# Patient Record
Sex: FEMALE | Race: WHITE | NOT HISPANIC OR LATINO | URBAN - METROPOLITAN AREA
[De-identification: names, ages, dates, MRNs, and addresses within clinical notes are randomized per-mention and may not be internally consistent; named-entity substitution may affect disease eponyms.]

---

## 2017-11-06 ENCOUNTER — IMPORTED ENCOUNTER (OUTPATIENT)
Dept: URBAN - METROPOLITAN AREA CLINIC 43 | Facility: CLINIC | Age: 47
End: 2017-11-06

## 2017-11-06 PROBLEM — D23.12: Noted: 2017-11-06

## 2017-11-06 PROBLEM — H16.223: Noted: 2017-11-06

## 2017-11-06 PROBLEM — H52.13: Noted: 2017-11-06

## 2017-11-06 PROBLEM — H25.13: Noted: 2017-11-06

## 2020-04-19 ASSESSMENT — TONOMETRY
OD_IOP_MMHG: 14.0
OS_IOP_MMHG: 15.0

## 2020-04-19 ASSESSMENT — KERATOMETRY
OS_AXISANGLE_DEGREES: 85
OS_K1POWER_DIOPTERS: 45
OD_AXISANGLE_DEGREES: 100
OD_K2POWER_DIOPTERS: 44.25
OD_K1POWER_DIOPTERS: 44.5
OD_AXISANGLE2_DEGREES: 10
OS_K2POWER_DIOPTERS: 44.25
OS_AXISANGLE2_DEGREES: 175

## 2020-04-19 ASSESSMENT — VISUAL ACUITY
OS_SC: 3'/100
OS_CC: J1
OD_SC: 3'/100
OD_CC: 20/25
OS_CC: 20/20
OD_CC: J1

## 2023-03-21 ENCOUNTER — APPOINTMENT (RX ONLY)
Dept: URBAN - METROPOLITAN AREA CLINIC 126 | Facility: CLINIC | Age: 53
Setting detail: DERMATOLOGY
End: 2023-03-21

## 2023-03-21 DIAGNOSIS — L72.11 PILAR CYST: ICD-10-CM

## 2023-03-21 DIAGNOSIS — L81.4 OTHER MELANIN HYPERPIGMENTATION: ICD-10-CM

## 2023-03-21 PROBLEM — D48.5 NEOPLASM OF UNCERTAIN BEHAVIOR OF SKIN: Status: ACTIVE | Noted: 2023-03-21

## 2023-03-21 PROCEDURE — ? DIAGNOSIS COMMENT

## 2023-03-21 PROCEDURE — ? COUNSELING

## 2023-03-21 PROCEDURE — ? OBSERVATION

## 2023-03-21 PROCEDURE — ? REFERRAL CORRESPONDENCE

## 2023-03-21 PROCEDURE — 99203 OFFICE O/P NEW LOW 30 MIN: CPT

## 2023-03-21 ASSESSMENT — LOCATION DETAILED DESCRIPTION DERM
LOCATION DETAILED: RIGHT INFERIOR OCCIPITAL SCALP
LOCATION DETAILED: LEFT CENTRAL MALAR CHEEK

## 2023-03-21 ASSESSMENT — LOCATION SIMPLE DESCRIPTION DERM
LOCATION SIMPLE: LEFT CHEEK
LOCATION SIMPLE: POSTERIOR SCALP

## 2023-03-21 ASSESSMENT — LOCATION ZONE DERM
LOCATION ZONE: SCALP
LOCATION ZONE: FACE

## 2023-03-21 NOTE — PROCEDURE: DIAGNOSIS COMMENT
Comment: Pt reports area has been present for over 10  years and increasing in size. Never biopsied.  Denies pain or drainage
Detail Level: Simple
Render Risk Assessment In Note?: yes